# Patient Record
Sex: MALE | Race: BLACK OR AFRICAN AMERICAN | NOT HISPANIC OR LATINO
[De-identification: names, ages, dates, MRNs, and addresses within clinical notes are randomized per-mention and may not be internally consistent; named-entity substitution may affect disease eponyms.]

---

## 2018-01-02 ENCOUNTER — APPOINTMENT (OUTPATIENT)
Dept: ORTHOPEDIC SURGERY | Facility: CLINIC | Age: 53
End: 2018-01-02
Payer: MEDICARE

## 2018-01-02 VITALS
SYSTOLIC BLOOD PRESSURE: 145 MMHG | HEIGHT: 68 IN | HEART RATE: 90 BPM | BODY MASS INDEX: 45.47 KG/M2 | DIASTOLIC BLOOD PRESSURE: 85 MMHG | WEIGHT: 300 LBS

## 2018-01-02 DIAGNOSIS — Z78.9 OTHER SPECIFIED HEALTH STATUS: ICD-10-CM

## 2018-01-02 DIAGNOSIS — Z87.39 PERSONAL HISTORY OF OTHER DISEASES OF THE MUSCULOSKELETAL SYSTEM AND CONNECTIVE TISSUE: ICD-10-CM

## 2018-01-02 DIAGNOSIS — M65.332 TRIGGER FINGER, LEFT MIDDLE FINGER: ICD-10-CM

## 2018-01-02 PROCEDURE — 20550 NJX 1 TENDON SHEATH/LIGAMENT: CPT | Mod: LT

## 2018-01-02 PROCEDURE — 99214 OFFICE O/P EST MOD 30 MIN: CPT | Mod: 25

## 2022-06-21 ENCOUNTER — APPOINTMENT (OUTPATIENT)
Dept: ORTHOPEDIC SURGERY | Facility: CLINIC | Age: 57
End: 2022-06-21
Payer: MEDICARE

## 2022-06-21 DIAGNOSIS — M25.531 PAIN IN RIGHT WRIST: ICD-10-CM

## 2022-06-21 PROCEDURE — 99204 OFFICE O/P NEW MOD 45 MIN: CPT | Mod: 25

## 2022-06-21 PROCEDURE — 20605 DRAIN/INJ JOINT/BURSA W/O US: CPT | Mod: 50

## 2022-06-21 PROCEDURE — 20526 THER INJECTION CARP TUNNEL: CPT | Mod: RT

## 2022-06-21 PROCEDURE — 73110 X-RAY EXAM OF WRIST: CPT | Mod: RT

## 2022-06-21 PROCEDURE — J3490M: CUSTOM

## 2022-06-21 RX ORDER — AMLODIPINE BESYLATE 5 MG/1
TABLET ORAL
Refills: 0 | Status: ACTIVE | COMMUNITY

## 2022-06-21 RX ORDER — VALSARTAN 40 MG/1
TABLET, COATED ORAL
Refills: 0 | Status: ACTIVE | COMMUNITY

## 2022-06-21 NOTE — ASSESSMENT
[FreeTextEntry1] : Pt provided right wrist intra-articular CSI today as well as left carpal tunnel CSI.\par Provided rx for samantha cockup wrist brace. \par Pt scheduled for left EMG tomorrow and he will rto in 2-3 weeks for f/u care\par

## 2022-06-21 NOTE — PROCEDURE
[Medium Joint Injection] : medium joint injection [Carpal Tunnel] : carpal tunnel [Bilateral] : bilaterally of the [Wrist] : wrist [Other: ____] : [unfilled] [Pain] : pain [Inflammation] : inflammation [X-ray evidence of Osteoarthritis on this or prior visit] : x-ray evidence of Osteoarthritis on this or prior visit [Alcohol] : alcohol [Ethyl Chloride sprayed topically] : ethyl chloride sprayed topically [Sterile technique used] : sterile technique used [___ cc    3mg] :  Betamethasone (Celestone) ~Vcc of 3mg [___ cc    1%] : Lidocaine ~Vcc of 1%  [] : Patient tolerated procedure well [Call if redness, pain or fever occur] : call if redness, pain or fever occur [Apply ice for 15min out of every hour for the next 12-24 hours as tolerated] : apply ice for 15 minutes out of every hour for the next 12-24 hours as tolerated [Previous OTC use and PT nontherapeutic] : patient has tried OTC's including aspirin, Ibuprofen, Aleve, etc or prescription NSAIDS, and/or exercises at home and/or physical therapy without satisfactory response [Patient had decreased mobility in the joint] : patient had decreased mobility in the joint [Risks, benefits, alternatives discussed / Verbal consent obtained] : the risks benefits, and alternatives have been discussed, and verbal consent was obtained [de-identified] : Left carpal tunnel and right wrist CSI performed today

## 2022-06-21 NOTE — HISTORY OF PRESENT ILLNESS
[de-identified] : 6/21/2022: Pt is a 57 year old RHD  M who presents today for evaluation of their right wrist.\par  Pt states that pain localized along the dorsal wrist s/p pushing up off a chair when his wrist buckled appx 4 mos ago.\par Pt was treated by PCP , however no xrays were performed and no brace was provided (though pt did eventually purchased on on his own, and noted no significant improvement). \par No N/T to the right upper extremity. (however he does complain of left upper extremity neuropathy that is intermittent-Pt is scheduled for EMG next week). \par  [] : Post Surgical Visit: no [FreeTextEntry1] : R wrist

## 2022-06-21 NOTE — PHYSICAL EXAM
[de-identified] : Right radial and dorsal wrist swelling.\par There is diffuse ttp over the right wrist.\par Strength is mildly limited in all planes due to discomfort.\par Hackett and TFCC Tests cannot be performed due to pain.\par All digits are nvi with FAROM. \par Carpal and Cubital Tunnel Tinel Signs are negative.\par \par Left Cubital Tunnel Tinel is negative.\par Left Carpal Tunnel Tinel Sign is positive.

## 2022-07-21 ENCOUNTER — APPOINTMENT (OUTPATIENT)
Dept: ORTHOPEDIC SURGERY | Facility: CLINIC | Age: 57
End: 2022-07-21

## 2022-07-21 VITALS — BODY MASS INDEX: 45.47 KG/M2 | HEIGHT: 68 IN | WEIGHT: 300 LBS

## 2022-07-21 PROCEDURE — 99214 OFFICE O/P EST MOD 30 MIN: CPT

## 2022-07-21 NOTE — HISTORY OF PRESENT ILLNESS
[de-identified] : 7/21/22:  Pt has some improvement in SLAC wrist after CSI.  Pt still has intermittent n/t in b/l fingers.  Pt has no relief with night bracing.\par \par EMG:\par 1. Moderate left and mild right Carpal Tunnel Syndrome\par 2. Right C5 radiculopathy\par \par 6/21/2022: Pt is a 57 year old RHD  M who presents today for evaluation of their right wrist.\par  Pt states that pain localized along the dorsal wrist s/p pushing up off a chair when his wrist buckled appx 4 mos ago.\par Pt was treated by PCP , however no xrays were performed and no brace was provided (though pt did eventually purchased on on his own, and noted no significant improvement). \par No N/T to the right upper extremity. (however he does complain of left upper extremity neuropathy that is intermittent-Pt is scheduled for EMG next week). \par  [] : Post Surgical Visit: no [FreeTextEntry1] : R wrist

## 2022-07-21 NOTE — PHYSICAL EXAM
[de-identified] : Right radial and dorsal wrist swelling.\par There is diffuse ttp over the right wrist.\par Strength is mildly limited in all planes due to discomfort.\par Hackett and TFCC Tests cannot be performed due to pain.\par All digits are nvi with FAROM. \par Carpal and Cubital Tunnel Tinel Signs are negative.\par \par Left Cubital Tunnel Tinel is negative.\par Left Carpal Tunnel Tinel Sign is positive.

## 2022-08-10 ENCOUNTER — APPOINTMENT (OUTPATIENT)
Dept: PULMONOLOGY | Facility: CLINIC | Age: 57
End: 2022-08-10

## 2022-08-10 VITALS
HEIGHT: 68 IN | DIASTOLIC BLOOD PRESSURE: 83 MMHG | BODY MASS INDEX: 47.74 KG/M2 | TEMPERATURE: 98.2 F | WEIGHT: 315 LBS | HEART RATE: 82 BPM | SYSTOLIC BLOOD PRESSURE: 124 MMHG | RESPIRATION RATE: 16 BRPM

## 2022-08-10 DIAGNOSIS — Z82.49 FAMILY HISTORY OF ISCHEMIC HEART DISEASE AND OTHER DISEASES OF THE CIRCULATORY SYSTEM: ICD-10-CM

## 2022-08-10 DIAGNOSIS — Z78.9 OTHER SPECIFIED HEALTH STATUS: ICD-10-CM

## 2022-08-10 DIAGNOSIS — G47.33 OBSTRUCTIVE SLEEP APNEA (ADULT) (PEDIATRIC): ICD-10-CM

## 2022-08-10 PROCEDURE — 99204 OFFICE O/P NEW MOD 45 MIN: CPT

## 2022-08-12 PROBLEM — Z82.49 FAMILY HISTORY OF HYPERTENSION: Status: ACTIVE | Noted: 2022-08-12

## 2022-08-12 PROBLEM — Z78.9 NEVER SMOKED TOBACCO: Status: ACTIVE | Noted: 2022-08-12

## 2022-08-12 PROBLEM — G47.33 OBSTRUCTIVE SLEEP APNEA, ADULT: Status: ACTIVE | Noted: 2022-08-12

## 2022-08-12 NOTE — CONSULT LETTER
[Dear  ___] : Dear  [unfilled], [Consult Letter:] : I had the pleasure of evaluating your patient, [unfilled]. [Please see my note below.] : Please see my note below. [Consult Closing:] : Thank you very much for allowing me to participate in the care of this patient.  If you have any questions, please do not hesitate to contact me. [Sincerely,] : Sincerely, [FreeTextEntry3] : Melani Nascimento MD\par Pulmonary, Critical Care, and Sleep Medicine\par Associate Medical Director, [facility]\par  of Medicine\par [employer] [facility]

## 2022-08-12 NOTE — PHYSICAL EXAM
[General Appearance - Well Developed] : well developed [Normal Appearance] : normal appearance [Well Groomed] : well groomed [General Appearance - Well Nourished] : well nourished [No Deformities] : no deformities [General Appearance - In No Acute Distress] : no acute distress [Normal Conjunctiva] : the conjunctiva exhibited no abnormalities [IV] : IV [Neck Appearance] : the appearance of the neck was normal [Apical Impulse] : the apical impulse was normal [Heart Rate And Rhythm] : heart rate was normal and rhythm regular [Heart Sounds] : normal S1 and S2 [Heart Sounds Gallop] : no gallops [] : no respiratory distress [Respiration, Rhythm And Depth] : normal respiratory rhythm and effort [Exaggerated Use Of Accessory Muscles For Inspiration] : no accessory muscle use [Auscultation Breath Sounds / Voice Sounds] : lungs were clear to auscultation bilaterally [Nail Clubbing] : no clubbing of the fingernails [Cyanosis, Localized] : no localized cyanosis [Skin Color & Pigmentation] : normal skin color and pigmentation [No Focal Deficits] : no focal deficits [Oriented To Time, Place, And Person] : oriented to person, place, and time [Impaired Insight] : insight and judgment were intact [Affect] : the affect was normal [Mood] : the mood was normal

## 2022-08-12 NOTE — ASSESSMENT
[FreeTextEntry1] : This is a 57 year year old male referred for evaluation of possible sleep apnea. The patient has a prior diagnosis of sleep apnea, unclear severity, and was previously on CPAP therapy.  He continues to have multiple signs and symptoms of sleep-disordered breathing including snoring, witnessed apneas, and daytime sleepiness.  Additionally, he is at risk for OHS given BMI > 50. He was referred to the [facility] for a split study with TCO2 monitoring. The ramifications of ASYA and its potential therapeutic modalities were discussed with the patient. He will follow up with me after the split study.\par

## 2022-08-12 NOTE — HISTORY OF PRESENT ILLNESS
[Snoring] : snoring [Witnessed Apneas] : witnessed sleep apnea [Awakes with Headache] : headache upon awakening [To Bed: ___] : ~he/she~ goes to bed at [unfilled] [Arises: ___] : arises at [unfilled] [Sleep Onset Latency: ___ minutes] : sleep onset latency of [unfilled] minutes reported [Nocturnal Awakenings: ___] : ~he/she~ typically has [unfilled] nocturnal awakenings [WASO: ___] : Wake time after sleep onset is [unfilled] [TST: ___] : Total sleep time is [unfilled] [Daytime Sleep: ___] : daytime sleep: [unfilled] [FreeTextEntry1] : This is a 57 year old male referred for evaluation of possible sleep apnea.\par \par Comorbid medical conditions include hypertension, gout, carpel tunnel, GERD.\par  \par Patient has a history of obstructive sleep apnea diagnosed 5 - 10 years ago.  He was on CPAP but stopped therapy.  He was recently seen by his primary doctor and reported snoring so he was advised to get evaluated for sleep apnea.  Other sleep-related symptoms and sleep schedule are as listed below. [Frequent Nocturnal Awakening] : frequent nocturnal awakening [Unintentional Sleep while Active] : no unintentional sleep while active [Unintentional Sleep While Inactive] : unintentional sleep while inactive [Awakes Unrefreshed] : awakening unrefreshed [Daytime Somnolence] : daytime somnolence [ESS] : 7

## 2022-08-12 NOTE — REVIEW OF SYSTEMS
[EDS: ESS=____] : daytime somnolence: ESS=[unfilled] [Snoring] : snoring [Obesity] : obesity [Arthralgias] : arthralgias [Negative] : Psychiatric

## 2022-08-23 ENCOUNTER — APPOINTMENT (OUTPATIENT)
Dept: GASTROENTEROLOGY | Facility: CLINIC | Age: 57
End: 2022-08-23

## 2022-08-23 VITALS
HEART RATE: 96 BPM | BODY MASS INDEX: 47.74 KG/M2 | SYSTOLIC BLOOD PRESSURE: 132 MMHG | WEIGHT: 315 LBS | TEMPERATURE: 98.2 F | DIASTOLIC BLOOD PRESSURE: 80 MMHG | HEIGHT: 68 IN | OXYGEN SATURATION: 99 %

## 2022-08-23 DIAGNOSIS — I10 ESSENTIAL (PRIMARY) HYPERTENSION: ICD-10-CM

## 2022-08-23 DIAGNOSIS — Z12.11 ENCOUNTER FOR SCREENING FOR MALIGNANT NEOPLASM OF COLON: ICD-10-CM

## 2022-08-23 DIAGNOSIS — Z12.12 ENCOUNTER FOR SCREENING FOR MALIGNANT NEOPLASM OF COLON: ICD-10-CM

## 2022-08-23 PROCEDURE — 99204 OFFICE O/P NEW MOD 45 MIN: CPT

## 2022-08-23 RX ORDER — POLYETHYLENE GLYCOL 3350, SODIUM CHLORIDE, SODIUM BICARBONATE AND POTASSIUM CHLORIDE WITH LEMON FLAVOR 420; 11.2; 5.72; 1.48 G/4L; G/4L; G/4L; G/4L
420 POWDER, FOR SOLUTION ORAL
Qty: 1 | Refills: 0 | Status: ACTIVE | COMMUNITY
Start: 2022-08-23 | End: 1900-01-01

## 2022-08-23 NOTE — HISTORY OF PRESENT ILLNESS
[de-identified] : Aug 23, 2022 \par \par JOSE ENRIQUE BIRMINGHAM MD\par \par Morbid obese pleasant male, BMI 50, ASYA\par \par Mr. DESIRAE MCKEON 57 year is referred for colon cancer screening.  The patient denies any change in bowel movements, blood per rectum, abdominal, pelvic or rectal discomfort.   \par \par There is no family history of colon cancer or other gastrointestinal cancers.\par \par The patient denies any unexplained weight loss, fever chills or night sweats.\par \par There is no significant cardiac or pulmonary history.\par \par No complaints of chest pain, shortness of breath, palpitations, cough.\par \par The patient is feeling quite well.\par \par The patient is on no significant anticoagulant therapy or anti platelet therapy. \par \par No adverse reaction to anesthesia in the past.\par \par

## 2022-08-23 NOTE — ASSESSMENT
[FreeTextEntry1] : Impression\par \par MOrbid obese\par \par Request for colon cancer screening\par \par Suggest\par \par Anesthesia clearance\par \par Agree with colonoscopy\par \par Nulytely\par \par The laxative, or its risks benefits and alternatives have been thoroughly reviewed with the patient in great detail. The laxative instructions have been reviewed in great detail with the patient.\par \par Risks/benefits:\par The procedure, the risks and benefits and alternatives have been reviewed in great detail with the patient.  Risks including, but not limited to sedation such as cardiac and pulmonary compromise, the procedure itself such as bleeding requiring hospitalization, transfusion, surgery, temporary or permanent colostomy.  Perforation or puncture of the requiring hospitalization, surgery, temporary colostomy.\par It has been explained to the patient that though colonoscopy is thought to be the best screening exam for colon cancer and polyps, no screening exam can find all colon polyps or cancers.  \par The patient expresses understanding of the procedure and consents to undergoing the procedure.\par

## 2022-08-23 NOTE — PHYSICAL EXAM
[General Appearance - Alert] : alert [General Appearance - In No Acute Distress] : in no acute distress [FreeTextEntry1] : morbid obese, appears healthy [Sclera] : the sclera and conjunctiva were normal [Neck Appearance] : the appearance of the neck was normal [Neck Cervical Mass (___cm)] : no neck mass was observed [Jugular Venous Distention Increased] : there was no jugular-venous distention [Thyroid Diffuse Enlargement] : the thyroid was not enlarged [Thyroid Nodule] : there were no palpable thyroid nodules [Auscultation Breath Sounds / Voice Sounds] : lungs were clear to auscultation bilaterally [Apical Impulse] : the apical impulse was normal [Bowel Sounds] : normal bowel sounds [Abdomen Soft] : soft [Abdomen Tenderness] : non-tender [Abdomen Mass (___ Cm)] : no abdominal mass palpated [Cervical Lymph Nodes Enlarged Posterior Bilaterally] : posterior cervical [Cervical Lymph Nodes Enlarged Anterior Bilaterally] : anterior cervical [Supraclavicular Lymph Nodes Enlarged Bilaterally] : supraclavicular [Axillary Lymph Nodes Enlarged Bilaterally] : axillary [Femoral Lymph Nodes Enlarged Bilaterally] : femoral [Inguinal Lymph Nodes Enlarged Bilaterally] : inguinal [No CVA Tenderness] : no ~M costovertebral angle tenderness [No Spinal Tenderness] : no spinal tenderness [Abnormal Walk] : normal gait [Nail Clubbing] : no clubbing  or cyanosis of the fingernails [Musculoskeletal - Swelling] : no joint swelling seen [Motor Tone] : muscle strength and tone were normal [Skin Color & Pigmentation] : normal skin color and pigmentation [Skin Turgor] : normal skin turgor [] : no rash [No Focal Deficits] : no focal deficits [Oriented To Time, Place, And Person] : oriented to person, place, and time [Impaired Insight] : insight and judgment were intact [Affect] : the affect was normal

## 2022-08-23 NOTE — REASON FOR VISIT
[Initial Evaluation] : an initial evaluation [FreeTextEntry1] : Colon cancer screening, morbid obese, BMI 50

## 2022-08-23 NOTE — CONSULT LETTER
[Dear  ___] : Dear  [unfilled], [Consult Letter:] : I had the pleasure of evaluating your patient, [unfilled]. [Please see my note below.] : Please see my note below. [Consult Closing:] : Thank you very much for allowing me to participate in the care of this patient.  If you have any questions, please do not hesitate to contact me. [Sincerely,] : Sincerely, [FreeTextEntry2] : Carrillo Cartwright MD\par [address]\[place] [id number] [FreeTextEntry3] : Iatlo Briggs MD\par

## 2022-09-14 LAB — SARS-COV-2 N GENE NPH QL NAA+PROBE: NOT DETECTED

## 2022-09-16 ENCOUNTER — APPOINTMENT (OUTPATIENT)
Age: 57
End: 2022-09-16

## 2022-09-16 PROCEDURE — 64721 CARPAL TUNNEL SURGERY: CPT | Mod: LT

## 2022-09-16 PROCEDURE — 99024 POSTOP FOLLOW-UP VISIT: CPT

## 2022-09-16 PROCEDURE — ZZZZZ: CPT

## 2022-09-16 RX ORDER — ACETAMINOPHEN AND CODEINE 300; 30 MG/1; MG/1
300-30 TABLET ORAL 4 TIMES DAILY
Qty: 12 | Refills: 0 | Status: ACTIVE | COMMUNITY
Start: 2022-09-16 | End: 1900-01-01

## 2022-09-29 ENCOUNTER — APPOINTMENT (OUTPATIENT)
Dept: ORTHOPEDIC SURGERY | Facility: CLINIC | Age: 57
End: 2022-09-29

## 2022-09-29 VITALS — HEIGHT: 68 IN | WEIGHT: 315 LBS | BODY MASS INDEX: 47.74 KG/M2

## 2022-09-29 PROCEDURE — 99024 POSTOP FOLLOW-UP VISIT: CPT

## 2022-09-29 NOTE — IMAGING
[de-identified] : Right radial and dorsal wrist swelling.\par There is diffuse ttp over the right wrist.\par Strength is mildly limited in all planes due to discomfort.\par Hackett and TFCC Tests cannot be performed due to pain.\par All digits are nvi with FAROM. \par Carpal and Cubital Tunnel Tinel Signs are negative.\par \par Left Cubital Tunnel Tinel is negative.\par Left hand with numbness noted to the left thumb, index , middle and ring fingers (to the radial side). \par wound clean dry and intact\par no erythema\par no drainage\par FAROM\par Numbness has mildly progressed to the median nerve distribution (however patient states his pain has improved).\par sutures removed.\par \par

## 2022-09-29 NOTE — ASSESSMENT
[FreeTextEntry1] : Pt provided reassurance that he should note improvement month to month.\par Will re-examine in 4 weeks.

## 2022-09-29 NOTE — HISTORY OF PRESENT ILLNESS
[de-identified] : 2022: Pt here 10 days s/p left carpal tunnel release. Pt now complains of constant tingling to the thumb, index, middle and ring fingers. Pt denies associated weakness.  Pt is frustrated as he has to utilize his left hand for the bathroom due to limited motion of the right shoulder (hx of previous right shoulder dislocation / he has been treated elsewhere for his shoulder issues).    22:  Pt has some improvement in SLAC wrist after CSI.  Pt still has intermittent n/t in b/l fingers.  Pt has no relief with night bracing.  EM. Moderate left and mild right Carpal Tunnel Syndrome 2. Right C5 radiculopathy  2022: Pt is a 57 year old RHD  M who presents today for evaluation of their right wrist.  Pt states that pain localized along the dorsal wrist s/p pushing up off a chair when his wrist buckled appx 4 mos ago. Pt was treated by PCP , however no xrays were performed and no brace was provided (though pt did eventually purchased on on his own, and noted no significant improvement).  No N/T to the right upper extremity. (however he does complain of left upper extremity neuropathy that is intermittent-Pt is scheduled for EMG next week).   [] : Post Surgical Visit: no [FreeTextEntry1] : R wrist

## 2022-10-03 DIAGNOSIS — Z01.812 ENCOUNTER FOR PREPROCEDURAL LABORATORY EXAMINATION: ICD-10-CM

## 2022-10-03 DIAGNOSIS — Z20.822 ENCOUNTER FOR PREPROCEDURAL LABORATORY EXAMINATION: ICD-10-CM

## 2022-10-19 ENCOUNTER — APPOINTMENT (OUTPATIENT)
Dept: ORTHOPEDIC SURGERY | Facility: CLINIC | Age: 57
End: 2022-10-19

## 2022-10-19 VITALS — BODY MASS INDEX: 47.74 KG/M2 | HEIGHT: 68 IN | WEIGHT: 315 LBS

## 2022-10-19 PROCEDURE — 99024 POSTOP FOLLOW-UP VISIT: CPT

## 2022-10-19 RX ORDER — GABAPENTIN 300 MG/1
300 CAPSULE ORAL
Qty: 30 | Refills: 0 | Status: ACTIVE | COMMUNITY
Start: 2022-10-19 | End: 1900-01-01

## 2022-10-19 NOTE — ASSESSMENT
[FreeTextEntry1] : Discussed wound care.\par EMG to eval for nerve recovery\par F/u after testing\par Start OT

## 2022-10-19 NOTE — HISTORY OF PRESENT ILLNESS
[5] : 5 [de-identified] : DOS: 9/16/22- LEFT CARPAL TUNNEL RELEASE\par \par 8/19/22:  Pt has still has n/t and scar sensitivity.\par \par 6/21/2022: Pt is a 57 year old RHD  M who presents today for evaluation of their right wrist.\par  Pt states that pain localized along the dorsal wrist s/p pushing up off a chair when his wrist buckled appx 4 mos ago.\par Pt was treated by PCP , however no xrays were performed and no brace was provided (though pt did eventually purchased on on his own, and noted no significant improvement). \par No N/T to the right upper extremity. (however he does complain of left upper extremity neuropathy that is intermittent-Pt is scheduled for EMG next week). \par  [] : Post Surgical Visit: no [FreeTextEntry1] : R wrist

## 2022-10-19 NOTE — IMAGING
[de-identified] : wound clean dry and intact\par no erythema\par no drainage\par FAROM\par NV unchanged\par sutures removed\par mild tinnel's 1 cm distal to scar and goes to \par \par \par

## 2022-10-24 ENCOUNTER — FORM ENCOUNTER (OUTPATIENT)
Age: 57
End: 2022-10-24

## 2022-10-25 ENCOUNTER — APPOINTMENT (OUTPATIENT)
Dept: NEUROLOGY | Facility: CLINIC | Age: 57
End: 2022-10-25

## 2022-10-25 ENCOUNTER — OUTPATIENT (OUTPATIENT)
Dept: OUTPATIENT SERVICES | Facility: HOSPITAL | Age: 57
LOS: 1 days | End: 2022-10-25
Payer: COMMERCIAL

## 2022-10-25 ENCOUNTER — APPOINTMENT (OUTPATIENT)
Dept: SLEEP CENTER | Facility: CLINIC | Age: 57
End: 2022-10-25

## 2022-10-25 DIAGNOSIS — R20.0 ANESTHESIA OF SKIN: ICD-10-CM

## 2022-10-25 DIAGNOSIS — R20.2 ANESTHESIA OF SKIN: ICD-10-CM

## 2022-10-25 PROCEDURE — 95911 NRV CNDJ TEST 9-10 STUDIES: CPT

## 2022-10-25 PROCEDURE — 95811 POLYSOM 6/>YRS CPAP 4/> PARM: CPT

## 2022-10-25 PROCEDURE — 95811 POLYSOM 6/>YRS CPAP 4/> PARM: CPT | Mod: 26

## 2022-10-25 PROCEDURE — 95886 MUSC TEST DONE W/N TEST COMP: CPT

## 2022-10-26 ENCOUNTER — APPOINTMENT (OUTPATIENT)
Dept: ORTHOPEDIC SURGERY | Facility: CLINIC | Age: 57
End: 2022-10-26

## 2022-10-26 VITALS — WEIGHT: 315 LBS | BODY MASS INDEX: 47.74 KG/M2 | HEIGHT: 68 IN

## 2022-10-26 PROCEDURE — 99024 POSTOP FOLLOW-UP VISIT: CPT

## 2022-10-26 NOTE — IMAGING
[de-identified] : wound clean dry and intact\par no erythema\par no drainage\par FAROM\par NV unchanged\par sutures removed\par mild tinnel's 1 cm distal to scar and goes to \par \par \par

## 2022-10-26 NOTE — ASSESSMENT
[FreeTextEntry1] : The patient was advised of the diagnosis. The natural history of the pathology was explained in full to the patient in layman's terms. All questions were answered.\par \par F/u in 1 month

## 2022-10-26 NOTE — HISTORY OF PRESENT ILLNESS
[4] : 4 [Dull/Aching] : dull/aching [Radiating] : radiating [Constant] : constant [Nothing helps with pain getting better] : Nothing helps with pain getting better [Sitting] : sitting [de-identified] : 10/26/2022 FU EMG report pt states he still has pain of the left wrist 4/10 \par \par DOS: 9/16/22- LEFT CARPAL TUNNEL RELEASE\par \par 8/19/22:  Pt has still has n/t and scar sensitivity.\par \par 6/21/2022: Pt is a 57 year old RHD  M who presents today for evaluation of their right wrist.\par  Pt states that pain localized along the dorsal wrist s/p pushing up off a chair when his wrist buckled appx 4 mos ago.\par Pt was treated by PCP , however no xrays were performed and no brace was provided (though pt did eventually purchased on on his own, and noted no significant improvement). \par No N/T to the right upper extremity. (however he does complain of left upper extremity neuropathy that is intermittent-Pt is scheduled for EMG next week). \par  [] : Post Surgical Visit: no [FreeTextEntry1] : Left wrist  [FreeTextEntry6] : Numbness  [FreeTextEntry7] : From the fingers into the wrist  [de-identified] : Activities  [de-identified] : 09/16/2022

## 2022-11-01 ENCOUNTER — APPOINTMENT (OUTPATIENT)
Dept: GASTROENTEROLOGY | Facility: AMBULATORY MEDICAL SERVICES | Age: 57
End: 2022-11-01

## 2022-11-01 PROCEDURE — 45380 COLONOSCOPY AND BIOPSY: CPT | Mod: 33

## 2022-11-03 ENCOUNTER — APPOINTMENT (OUTPATIENT)
Dept: ORTHOPEDIC SURGERY | Facility: CLINIC | Age: 57
End: 2022-11-03

## 2022-11-08 ENCOUNTER — APPOINTMENT (OUTPATIENT)
Dept: PULMONOLOGY | Facility: CLINIC | Age: 57
End: 2022-11-08

## 2022-11-08 PROCEDURE — 99441: CPT

## 2022-11-09 ENCOUNTER — NON-APPOINTMENT (OUTPATIENT)
Age: 57
End: 2022-11-09

## 2022-11-23 ENCOUNTER — APPOINTMENT (OUTPATIENT)
Dept: ORTHOPEDIC SURGERY | Facility: CLINIC | Age: 57
End: 2022-11-23

## 2022-11-23 PROCEDURE — 99024 POSTOP FOLLOW-UP VISIT: CPT

## 2022-11-23 NOTE — IMAGING
[de-identified] : sensitive scar\par FAROM\par NV improving\par mild tinnel's 2 cm distal to scar and goes to MF\par \par \par

## 2022-11-23 NOTE — ASSESSMENT
[FreeTextEntry1] : The patient was advised of the diagnosis. The natural history of the pathology was explained in full to the patient in layman's terms. All questions were answered.\par \par Continue therapy

## 2022-11-23 NOTE — HISTORY OF PRESENT ILLNESS
[4] : 4 [Dull/Aching] : dull/aching [Radiating] : radiating [Constant] : constant [Nothing helps with pain getting better] : Nothing helps with pain getting better [Sitting] : sitting [de-identified] : 11/23/22:  Pt has been doing therapy and reports that his strength is coming back and scar sensitivity is improving.\par \par 10/26/2022 FU EMG report pt states he still has pain of the left wrist 4/10 \par \par DOS: 9/16/22- LEFT CARPAL TUNNEL RELEASE\par \par 8/19/22:  Pt has still has n/t and scar sensitivity.\par \par 6/21/2022: Pt is a 57 year old RHD  M who presents today for evaluation of their right wrist.\par  Pt states that pain localized along the dorsal wrist s/p pushing up off a chair when his wrist buckled appx 4 mos ago.\par Pt was treated by PCP , however no xrays were performed and no brace was provided (though pt did eventually purchased on on his own, and noted no significant improvement). \par No N/T to the right upper extremity. (however he does complain of left upper extremity neuropathy that is intermittent-Pt is scheduled for EMG next week). \par  [] : Post Surgical Visit: no [FreeTextEntry1] : Left wrist  [FreeTextEntry6] : Numbness  [FreeTextEntry7] : From the fingers into the wrist  [de-identified] : Activities  [de-identified] : 09/16/2022

## 2022-12-06 DIAGNOSIS — G47.33 OBSTRUCTIVE SLEEP APNEA (ADULT) (PEDIATRIC): ICD-10-CM

## 2023-01-03 ENCOUNTER — NON-APPOINTMENT (OUTPATIENT)
Age: 58
End: 2023-01-03

## 2023-01-03 ENCOUNTER — APPOINTMENT (OUTPATIENT)
Dept: ORTHOPEDIC SURGERY | Facility: CLINIC | Age: 58
End: 2023-01-03
Payer: MEDICARE

## 2023-01-03 VITALS — HEIGHT: 68 IN | BODY MASS INDEX: 47.74 KG/M2 | WEIGHT: 315 LBS

## 2023-01-03 PROCEDURE — 99204 OFFICE O/P NEW MOD 45 MIN: CPT | Mod: 25

## 2023-01-03 PROCEDURE — 20605 DRAIN/INJ JOINT/BURSA W/O US: CPT | Mod: RT

## 2023-01-05 ENCOUNTER — APPOINTMENT (OUTPATIENT)
Dept: ORTHOPEDIC SURGERY | Facility: CLINIC | Age: 58
End: 2023-01-05
Payer: MEDICARE

## 2023-01-05 VITALS — BODY MASS INDEX: 47.74 KG/M2 | HEIGHT: 68 IN | WEIGHT: 315 LBS

## 2023-01-05 DIAGNOSIS — G56.02 CARPAL TUNNEL SYNDROME, LEFT UPPER LIMB: ICD-10-CM

## 2023-01-05 PROCEDURE — 99213 OFFICE O/P EST LOW 20 MIN: CPT

## 2023-01-05 NOTE — HISTORY OF PRESENT ILLNESS
[4] : 4 [Dull/Aching] : dull/aching [Radiating] : radiating [Constant] : constant [Nothing helps with pain getting better] : Nothing helps with pain getting better [Sitting] : sitting [de-identified] : DOS: 9/16/22- LEFT CARPAL TUNNEL RELEASE\par \par \par 1/5/2022: Pt states his symptoms were intermittent prior to surgery. S/P left carpal tunnel release the numbness has diminished, however it is now 24/7. Pt states that OT has helped with strengthening. \par Pt has recently obtained a second opinion from Dr. Heard regarding right carpal tunnel syndrome and he received a CSI to the right radiocarpal joint for tx of SLAC wrist.\par \par Pt states that he was advised by Dr. Heard that his numbness/tingling will likely improve over 12 mos time. \par \par 11/23/22:  Pt has been doing therapy and reports that his strength is coming back and scar sensitivity is improving.\par \par 10/26/2022 FU EMG report pt states he still has pain of the left wrist 4/10 \par \par \par 8/19/22:  Pt has still has n/t and scar sensitivity.\par \par 6/21/2022: Pt is a 57 year old RHD  M who presents today for evaluation of their right wrist.\par  Pt states that pain localized along the dorsal wrist s/p pushing up off a chair when his wrist buckled appx 4 mos ago.\par Pt was treated by PCP , however no xrays were performed and no brace was provided (though pt did eventually purchased on on his own, and noted no significant improvement). \par No N/T to the right upper extremity. (however he does complain of left upper extremity neuropathy that is intermittent-Pt is scheduled for EMG next week). \par  [] : Post Surgical Visit: no [FreeTextEntry1] : Left wrist  [FreeTextEntry6] : Numbness  [FreeTextEntry7] : From the fingers into the wrist  [de-identified] : Activities  [de-identified] : 09/16/2022

## 2023-01-05 NOTE — IMAGING
[de-identified] : There is no significant ttp over the well healed surgical scar.\par FAROM\par Pt stll with mild numbness to the median nerve distribution.\par mild tinel's at distal palmar crease and goes to MF\par Carpal Tunnel Compression Test is negative.\par Cubital Tunnel Tinel Sign is negative.\par Spurling Signs are negative.\par There is no atrophy noted to the right hand. \par \par \par

## 2023-02-21 ENCOUNTER — APPOINTMENT (OUTPATIENT)
Dept: ORTHOPEDIC SURGERY | Facility: CLINIC | Age: 58
End: 2023-02-21
Payer: MEDICARE

## 2023-03-16 ENCOUNTER — APPOINTMENT (OUTPATIENT)
Dept: ORTHOPEDIC SURGERY | Facility: CLINIC | Age: 58
End: 2023-03-16
Payer: MEDICARE

## 2023-03-16 DIAGNOSIS — M19.131 POST-TRAUMATIC OSTEOARTHRITIS, RIGHT WRIST: ICD-10-CM

## 2023-03-16 DIAGNOSIS — G56.01 CARPAL TUNNEL SYNDROME, RIGHT UPPER LIMB: ICD-10-CM

## 2023-03-16 PROCEDURE — 99214 OFFICE O/P EST MOD 30 MIN: CPT | Mod: 57

## 2025-03-24 ENCOUNTER — APPOINTMENT (OUTPATIENT)
Dept: PULMONOLOGY | Facility: CLINIC | Age: 60
End: 2025-03-24
Payer: MEDICARE

## 2025-03-24 VITALS
HEART RATE: 100 BPM | SYSTOLIC BLOOD PRESSURE: 159 MMHG | WEIGHT: 315 LBS | BODY MASS INDEX: 47.74 KG/M2 | OXYGEN SATURATION: 94 % | DIASTOLIC BLOOD PRESSURE: 101 MMHG | TEMPERATURE: 98 F | HEIGHT: 68 IN

## 2025-03-24 DIAGNOSIS — R09.81 NASAL CONGESTION: ICD-10-CM

## 2025-03-24 DIAGNOSIS — E66.01 MORBID (SEVERE) OBESITY DUE TO EXCESS CALORIES: ICD-10-CM

## 2025-03-24 DIAGNOSIS — G47.33 OBSTRUCTIVE SLEEP APNEA (ADULT) (PEDIATRIC): ICD-10-CM

## 2025-03-24 PROCEDURE — G2211 COMPLEX E/M VISIT ADD ON: CPT

## 2025-03-24 PROCEDURE — 36415 COLL VENOUS BLD VENIPUNCTURE: CPT

## 2025-03-24 PROCEDURE — 99214 OFFICE O/P EST MOD 30 MIN: CPT

## 2025-03-24 RX ORDER — FLUTICASONE PROPIONATE 50 UG/1
50 SPRAY, METERED NASAL TWICE DAILY
Qty: 1 | Refills: 2 | Status: ACTIVE | COMMUNITY
Start: 2025-03-24 | End: 1900-01-01

## 2025-04-04 ENCOUNTER — NON-APPOINTMENT (OUTPATIENT)
Age: 60
End: 2025-04-04

## 2025-04-04 LAB
ALBUMIN SERPL ELPH-MCNC: 4.7 G/DL
ALP BLD-CCNC: 60 U/L
ALT SERPL-CCNC: 25 U/L
ANION GAP SERPL CALC-SCNC: 12 MMOL/L
AST SERPL-CCNC: 24 U/L
BASOPHILS # BLD AUTO: 0.02 K/UL
BASOPHILS NFR BLD AUTO: 0.4 %
BILIRUB SERPL-MCNC: 1.3 MG/DL
BUN SERPL-MCNC: 13 MG/DL
CALCIUM SERPL-MCNC: 9.6 MG/DL
CHLORIDE SERPL-SCNC: 109 MMOL/L
CO2 SERPL-SCNC: 24 MMOL/L
CREAT SERPL-MCNC: 1.15 MG/DL
EGFRCR SERPLBLD CKD-EPI 2021: 73 ML/MIN/1.73M2
EOSINOPHIL # BLD AUTO: 0.13 K/UL
EOSINOPHIL NFR BLD AUTO: 2.4 %
ESTIMATED AVERAGE GLUCOSE: 140 MG/DL
GLUCOSE SERPL-MCNC: 97 MG/DL
HBA1C MFR BLD HPLC: 6.5 %
HCT VFR BLD CALC: 41.3 %
HGB BLD-MCNC: 13.3 G/DL
IMM GRANULOCYTES NFR BLD AUTO: 0.2 %
LYMPHOCYTES # BLD AUTO: 1.6 K/UL
LYMPHOCYTES NFR BLD AUTO: 29.4 %
MAN DIFF?: NORMAL
MCHC RBC-ENTMCNC: 26.1 PG
MCHC RBC-ENTMCNC: 32.2 G/DL
MCV RBC AUTO: 81.1 FL
MONOCYTES # BLD AUTO: 0.51 K/UL
MONOCYTES NFR BLD AUTO: 9.4 %
NEUTROPHILS # BLD AUTO: 3.17 K/UL
NEUTROPHILS NFR BLD AUTO: 58.2 %
PLATELET # BLD AUTO: 137 K/UL
POTASSIUM SERPL-SCNC: 4.7 MMOL/L
PROT SERPL-MCNC: 7.1 G/DL
RBC # BLD: 5.09 M/UL
RBC # FLD: 14.5 %
SODIUM SERPL-SCNC: 146 MMOL/L
WBC # FLD AUTO: 5.44 K/UL

## 2025-04-30 ENCOUNTER — OUTPATIENT (OUTPATIENT)
Dept: OUTPATIENT SERVICES | Facility: HOSPITAL | Age: 60
LOS: 1 days | End: 2025-04-30
Payer: COMMERCIAL

## 2025-04-30 ENCOUNTER — APPOINTMENT (OUTPATIENT)
Dept: SLEEP CENTER | Facility: CLINIC | Age: 60
End: 2025-04-30

## 2025-04-30 PROCEDURE — 95800 SLP STDY UNATTENDED: CPT | Mod: 26

## 2025-04-30 PROCEDURE — 95800 SLP STDY UNATTENDED: CPT

## 2025-05-08 DIAGNOSIS — G47.33 OBSTRUCTIVE SLEEP APNEA (ADULT) (PEDIATRIC): ICD-10-CM

## 2025-05-16 ENCOUNTER — NON-APPOINTMENT (OUTPATIENT)
Age: 60
End: 2025-05-16

## 2025-05-21 ENCOUNTER — NON-APPOINTMENT (OUTPATIENT)
Age: 60
End: 2025-05-21

## 2025-05-23 ENCOUNTER — APPOINTMENT (OUTPATIENT)
Dept: PULMONOLOGY | Facility: CLINIC | Age: 60
End: 2025-05-23

## 2025-07-21 ENCOUNTER — APPOINTMENT (OUTPATIENT)
Dept: PULMONOLOGY | Facility: CLINIC | Age: 60
End: 2025-07-21
Payer: MEDICARE

## 2025-07-21 VITALS
WEIGHT: 315 LBS | OXYGEN SATURATION: 98 % | HEART RATE: 91 BPM | HEIGHT: 67 IN | BODY MASS INDEX: 49.44 KG/M2 | SYSTOLIC BLOOD PRESSURE: 140 MMHG | RESPIRATION RATE: 20 BRPM | DIASTOLIC BLOOD PRESSURE: 86 MMHG

## 2025-07-21 DIAGNOSIS — G47.33 OBSTRUCTIVE SLEEP APNEA (ADULT) (PEDIATRIC): ICD-10-CM

## 2025-07-21 PROCEDURE — 94729 DIFFUSING CAPACITY: CPT

## 2025-07-21 PROCEDURE — 99214 OFFICE O/P EST MOD 30 MIN: CPT | Mod: 25,GC

## 2025-07-21 PROCEDURE — 94726 PLETHYSMOGRAPHY LUNG VOLUMES: CPT

## 2025-07-21 PROCEDURE — ZZZZZ: CPT

## 2025-07-21 PROCEDURE — 94060 EVALUATION OF WHEEZING: CPT
